# Patient Record
Sex: MALE | Race: WHITE | HISPANIC OR LATINO | Employment: UNEMPLOYED | URBAN - METROPOLITAN AREA
[De-identification: names, ages, dates, MRNs, and addresses within clinical notes are randomized per-mention and may not be internally consistent; named-entity substitution may affect disease eponyms.]

---
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
## 2024-01-27 ENCOUNTER — HOSPITAL ENCOUNTER (EMERGENCY)
Facility: HOSPITAL | Age: 24
Discharge: HOME | End: 2024-01-27
Attending: EMERGENCY MEDICINE
Payer: COMMERCIAL

## 2024-01-27 ENCOUNTER — APPOINTMENT (OUTPATIENT)
Dept: ULTRASOUND IMAGING | Facility: HOSPITAL | Age: 24
End: 2024-01-27
Payer: COMMERCIAL

## 2024-01-27 VITALS
HEART RATE: 68 BPM | WEIGHT: 160 LBS | HEIGHT: 70 IN | OXYGEN SATURATION: 97 % | RESPIRATION RATE: 16 BRPM | SYSTOLIC BLOOD PRESSURE: 110 MMHG | DIASTOLIC BLOOD PRESSURE: 65 MMHG | TEMPERATURE: 98.3 F | BODY MASS INDEX: 22.9 KG/M2

## 2024-01-27 DIAGNOSIS — N50.811 RIGHT TESTICULAR PAIN: Primary | ICD-10-CM

## 2024-01-27 LAB
BACTERIA UR QL AUTO: NORMAL /HPF
BILIRUB UR QL STRIP: NEGATIVE
CLARITY UR: ABNORMAL
COLOR UR: YELLOW
GLUCOSE UR STRIP-MCNC: NEGATIVE MG/DL
HGB UR QL STRIP.AUTO: NEGATIVE
HYALINE CASTS UR QL AUTO: NORMAL /LPF
KETONES UR QL STRIP: NEGATIVE
LEUKOCYTE ESTERASE UR QL STRIP.AUTO: NEGATIVE
NITRITE UR QL STRIP: NEGATIVE
PH UR STRIP.AUTO: 8 [PH] (ref 5–8)
PROT UR QL STRIP: NEGATIVE
RBC # UR STRIP: NORMAL /HPF
REF LAB TEST METHOD: NORMAL
SP GR UR STRIP: 1.02 (ref 1–1.03)
SQUAMOUS #/AREA URNS HPF: NORMAL /HPF
UROBILINOGEN UR QL STRIP: ABNORMAL
WBC # UR STRIP: NORMAL /HPF

## 2024-01-27 PROCEDURE — 93976 VASCULAR STUDY: CPT

## 2024-01-27 PROCEDURE — 81001 URINALYSIS AUTO W/SCOPE: CPT | Performed by: EMERGENCY MEDICINE

## 2024-01-27 PROCEDURE — 76870 US EXAM SCROTUM: CPT

## 2024-01-27 PROCEDURE — 99284 EMERGENCY DEPT VISIT MOD MDM: CPT

## 2024-01-27 RX ORDER — NAPROXEN 500 MG/1
500 TABLET ORAL ONCE
Status: DISCONTINUED | OUTPATIENT
Start: 2024-01-27 | End: 2024-01-27 | Stop reason: HOSPADM

## 2024-01-28 NOTE — ED PROVIDER NOTES
Subjective   History of Present Illness  23-year-old male presents for evaluation of atraumatic right-sided testicular pain.  He tells me that his symptoms started approximately 3 hours before coming to the emergency department while moving furniture.  He had been doing heavy lifting and notes that the pain has persisted for the past 3 hours.  He had a telehealth visit who advised him to go to the urgent care.  Urgent care then subsequently advised him to come here for further evaluation.  The patient denies any injury to his genital region.  He denies any urinary symptoms.  No rash.  No fevers.  He currently rates his pain at 7 out of 10 in severity and notes that the pain is worse with palpation.  He states that he has had a similar episode before in the past with heavy lifting as well.  He denies any abdominal pain.      Review of Systems   Genitourinary:  Positive for testicular pain.   All other systems reviewed and are negative.      No past medical history on file.    No Known Allergies    Past Surgical History:   Procedure Laterality Date    NO PAST SURGERIES         No family history on file.    Social History     Socioeconomic History    Marital status: Single   Tobacco Use    Smoking status: Never    Smokeless tobacco: Never           Objective   Physical Exam  Vitals and nursing note reviewed.   Constitutional:       General: He is not in acute distress.     Appearance: Normal appearance. He is well-developed. He is not diaphoretic.      Comments: Well-appearing male in no acute distress   HENT:      Head: Normocephalic and atraumatic.   Neck:      Vascular: No JVD.   Cardiovascular:      Rate and Rhythm: Normal rate and regular rhythm.      Heart sounds: Normal heart sounds. No murmur heard.     No friction rub. No gallop.   Pulmonary:      Effort: Pulmonary effort is normal. No respiratory distress.      Breath sounds: Normal breath sounds. No wheezing or rales.   Abdominal:      General: Bowel sounds  are normal. There is no distension.      Palpations: Abdomen is soft. There is no mass.      Tenderness: There is no abdominal tenderness. There is no guarding.      Comments: No focal abdominal tenderness, no peritoneal signs, no pain out of proportion to exam   Genitourinary:     Comments: Mild tenderness noted to right testicular region, no swelling noted to right hemiscrotum, no  rashes or lesions present, no palpable hernia, normal testicular lie  Musculoskeletal:         General: Normal range of motion.   Skin:     General: Skin is warm and dry.      Coloration: Skin is not pale.      Findings: No erythema or rash.   Neurological:      Mental Status: He is alert and oriented to person, place, and time.      Comments: Normal gait   Psychiatric:         Mood and Affect: Mood normal.         Thought Content: Thought content normal.         Judgment: Judgment normal.         Procedures           ED Course  ED Course as of 01/28/24 0204   Sat Jan 27, 2024   1824 23-year-old male presents for evaluation of atraumatic right-sided testicular pain.  He tells me that his symptoms started approximately 3 hours before coming to the emergency department while moving furniture.  The pain has persisted since that time.  He had a telehealth visit who advised him to go to urgent care, and he was subsequently referred here from urgent care for evaluation.  He denies any urinary symptoms.  No abdominal pain.  On arrival to the ED, the patient is nontoxic-appearing.  Nonsurgical abdomen.  No  rash noted.  On exam, the patient has mild right-sided testicular tenderness present.  Normal testicular lie noted.  No scrotal swelling present.  Differential diagnosis is quite broad.  We will obtain an ultrasound to assess for torsion and urinalysis, and we will reassess following initial interventions.  Pain control provided.  No palpable hernia noted. [DD]   1904 Urinalysis is clean. [DD]   1935 I personally and independently viewed  the patient's US images myself, and I am in agreement with the radiologist's reading for final interpretation--particularly, there is no evidence of testicular torsion noted. [DD]      ED Course User Index  [DD] Quinn Villegas MD                                   Recent Results (from the past 24 hour(s))   Urinalysis With Culture If Indicated - Urine, Clean Catch    Collection Time: 01/27/24  6:36 PM    Specimen: Urine, Clean Catch   Result Value Ref Range    Color, UA Yellow Yellow, Straw    Appearance, UA Turbid (A) Clear    pH, UA 8.0 5.0 - 8.0    Specific Gravity, UA 1.023 1.001 - 1.030    Glucose, UA Negative Negative    Ketones, UA Negative Negative    Bilirubin, UA Negative Negative    Blood, UA Negative Negative    Protein, UA Negative Negative    Leuk Esterase, UA Negative Negative    Nitrite, UA Negative Negative    Urobilinogen, UA 1.0 E.U./dL 0.2 - 1.0 E.U./dL   Urinalysis, Microscopic Only - Urine, Clean Catch    Collection Time: 01/27/24  6:36 PM    Specimen: Urine, Clean Catch   Result Value Ref Range    RBC, UA 0-2 None Seen, 0-2 /HPF    WBC, UA 0-2 None Seen, 0-2 /HPF    Bacteria, UA None Seen None Seen, Trace /HPF    Squamous Epithelial Cells, UA None Seen None Seen, 0-2 /HPF    Hyaline Casts, UA None Seen 0 - 6 /LPF    Methodology Automated Microscopy      Note: In addition to lab results from this visit, the labs listed above may include labs taken at another facility or during a different encounter within the last 24 hours. Please correlate lab times with ED admission and discharge times for further clarification of the services performed during this visit.    US Scrotum & Testicles   Final Result   Normal scrotal contents. Normal testicles.         Electronically Signed: Alfred Neville MD     1/27/2024 7:40 PM EST     Workstation ID: [id number]      US Testicular or Ovarian Vascular Limited   Final Result   Normal scrotal contents. Normal testicles.         Electronically Signed: Alfred Neville  "MD     1/27/2024 7:40 PM EST     Workstation ID: [id number]        Vitals:    01/27/24 1820 01/27/24 1822 01/27/24 1900   BP: 131/73 131/73 110/65   BP Location:  Right arm    Patient Position:  Sitting    Pulse:  75 68   Resp:  16    Temp:  98.3 °F (36.8 °C)    TempSrc:  Oral    SpO2:  97% 97%   Weight:  72.6 kg (160 lb)    Height:  177.8 cm (70\")      Medications - No data to display  ECG/EMG Results (last 24 hours)       ** No results found for the last 24 hours. **          No orders to display                 Medical Decision Making  Problems Addressed:  Right testicular pain: complicated acute illness or injury    Amount and/or Complexity of Data Reviewed  Radiology: ordered.        Final diagnoses:   Right testicular pain       ED Disposition  ED Disposition       ED Disposition   Discharge    Condition   Stable    Comment   --               Dylan Joyner MD  [address] [id number] [phone]      As needed         Medication List      No changes were made to your prescriptions during this visit.            Quinn Villegas MD  01/28/24 0206    "